# Patient Record
Sex: FEMALE | Race: WHITE | NOT HISPANIC OR LATINO
[De-identification: names, ages, dates, MRNs, and addresses within clinical notes are randomized per-mention and may not be internally consistent; named-entity substitution may affect disease eponyms.]

---

## 2022-04-20 ENCOUNTER — NON-APPOINTMENT (OUTPATIENT)
Age: 62
End: 2022-04-20

## 2022-06-09 ENCOUNTER — NON-APPOINTMENT (OUTPATIENT)
Age: 62
End: 2022-06-09

## 2022-06-09 ENCOUNTER — APPOINTMENT (OUTPATIENT)
Dept: CARDIOLOGY | Facility: CLINIC | Age: 62
End: 2022-06-09
Payer: MEDICAID

## 2022-06-09 VITALS
DIASTOLIC BLOOD PRESSURE: 62 MMHG | HEIGHT: 64 IN | OXYGEN SATURATION: 98 % | HEART RATE: 72 BPM | BODY MASS INDEX: 26.8 KG/M2 | WEIGHT: 157 LBS | RESPIRATION RATE: 16 BRPM | SYSTOLIC BLOOD PRESSURE: 110 MMHG

## 2022-06-09 DIAGNOSIS — G43.909 MIGRAINE, UNSPECIFIED, NOT INTRACTABLE, W/OUT STATUS MIGRAINOSUS: ICD-10-CM

## 2022-06-09 DIAGNOSIS — M81.0 AGE-RELATED OSTEOPOROSIS W/OUT CURRENT PATHOLOGICAL FRACTURE: ICD-10-CM

## 2022-06-09 DIAGNOSIS — J45.909 UNSPECIFIED ASTHMA, UNCOMPLICATED: ICD-10-CM

## 2022-06-09 PROCEDURE — 99214 OFFICE O/P EST MOD 30 MIN: CPT

## 2022-06-09 PROCEDURE — 93000 ELECTROCARDIOGRAM COMPLETE: CPT

## 2022-06-09 RX ORDER — ASPIRIN 81 MG/1
81 TABLET, COATED ORAL
Refills: 0 | Status: ACTIVE | COMMUNITY
Start: 2022-06-09

## 2022-06-09 RX ORDER — ATORVASTATIN CALCIUM 80 MG/1
80 TABLET, FILM COATED ORAL DAILY
Refills: 0 | Status: ACTIVE | COMMUNITY

## 2022-06-09 NOTE — ASSESSMENT
[FreeTextEntry1] : EKG: Sinus rhythm with no significant ST or T wave changes.\par \par 61-year-old female with a past medical history of migraines who recently presented to the hospital with aphasia and right-sided weakness and was found to have a TIA.  The possibility that this was actually complex migraine is also possible but ultimately TIA appears to be most likely.  Work-up in the hospital was unremarkable including an echocardiogram which showed a normal EF and no significant valve disease.  There was no evidence of stroke on testing or any significant carotid disease.  Blood pressure appears to be well controlled.  She was started on aspirin and Plavix along with high-dose statin.  Plavix was recommended for 3 months by neurology.  She asked if she needs to remain on high-dose statin and I will continue that for now but may consider lowering in the future.  She is having some leg cramps but these seem tolerable for now.

## 2022-06-09 NOTE — HISTORY OF PRESENT ILLNESS
[FreeTextEntry1] : Patient presents back to the office today after recently being seen in the hospital where she presented with a TIA.  The symptoms at that time were of some right-sided weakness as well as issues with aphasia.  Symptoms resolved in the hospital.  Imaging showed no evidence of stroke.  She was discharged home, and since being home has noted some occasional issues with aphasia but otherwise feels well.  She says there is sometimes a "disconnect between my brain in my mouth" and also she sometimes has issues with transposing numbers.  She has been having some leg cramps recently and was told that this could be secondary to her Lipitor.  It was very bad for about a week but has since been improving.  She has been monitoring her blood pressure at home on a weekly basis and says it has been "good" but did not bring any numbers with her today.  Patient denies chest pain, shortness of breath, palpitations, orthopnea, presyncope, syncope.

## 2022-06-09 NOTE — DISCUSSION/SUMMARY
[FreeTextEntry1] : 1.  Plan DAVID to complete the work-up for her TIA.\par 2.  Continue Plavix until July 20 and then she can discontinue.  Continue aspirin indefinitely.\par 3.  Continue atorvastatin at 80 mg daily for now.  May consider lowering the dose in the future especially if she has more leg cramps.\par 4.  No additional cardiac medications at this time.\par 5.  Monitor BP at home, keep a log and bring to f/u.\par 6.  Follow-up with neurology.\par 7.  She will have blood work done and get me a copy.\par 8.  Patient is encouraged to exercise at least 30 minutes a day everyday of the week.\par 9.  Follow up here in three months.

## 2022-07-22 RX ORDER — CHLORHEXIDINE GLUCONATE 213 G/1000ML
1 SOLUTION TOPICAL ONCE
Refills: 0 | Status: DISCONTINUED | OUTPATIENT
Start: 2022-07-25 | End: 2022-08-08

## 2022-07-22 NOTE — H&P PST ADULT - HISTORY OF PRESENT ILLNESS
Narrative:     61 yo female with PMHX of Asthma, Migraines, Osteoporosis, who recently had an admission ([facility]) 6/13/22 for stroke like symptoms. Patient reports recent hospital visit where she presented with TIA. Her symptoms were right-sided weakness associated with aphasia. Her symptoms resolved in the hospital. CTH did not show evidence of stroke. Patient endorses "disconnect between my brain and mouth" since hospital discharge. Work up in the hospital was unremarkable including echocardiogram (6/13/22) which showed normal EF 60-65% and no significant valve disease; mild MR, mild to moderate TR. No evidence of stroke or carotid disease. Since TIA, she was placed on Plavix until July 20th, now discontinued. Patient now presents for transesophageal echocardiogram to evaluate structural heart, r/o cardioembolic source of patient's symptoms.       Symptoms:        Angina (Class):        Ischemic Symptoms:     Heart Failure:        Systolic/Diastolic/Combined:        NYHA Class (within 2 weeks):     Assessment of LVEF (Must be within 6 months):       EF:        Assessed by:        Date:     Prior Cardiac Interventions (LHC, stents, CABG):       PCI's (Date, Stents, Vessels):        CABG (Date, Grafts):     Noninvasive Testing:   Stress Test: Date:        Protocol:        Duration of Exercise:        Symptoms:        EKG Changes:        DTS:        Myocardial Imaging:        Risk Assessment (Low, Medium, High):     Echo (Date, Findings):     Antianginal Therapies:        Beta Blockers:         Calcium Channel Blockers:        Long Acting Nitrates:        Ranexa:     Associated Risk Factors:        Cerebrovascular Disease: N/A       Chronic Lung Disease: N/A       Peripheral Arterial Disease: N/A       Chronic Kidney Disease (if yes, what is GFR): N/A       Uncontrolled Diabetes (if yes, what is HgbA1C or FBS): N/A       Poorly Controlled Hypertension (if yes, what is SBP): N/A       Morbid Obesity (if yes, what is BMI): N/A       History of Recent Ventricular Arrhythmia: N/A       Inability to Ambulate Safely: N/A       Need for Therapeutic Anticoagulation: N/A       Antiplatelet or Contrast Allergy: N/A

## 2022-07-22 NOTE — H&P PST ADULT - ASSESSMENT
63 yo female with PMHX of Asthma, Migraines, Osteoporosis, who recently had an admission ([facility]) 6/13/22 for stroke like symptoms. Patient reports recent hospital visit where she presented with TIA. Her symptoms were right-sided weakness associated with aphasia. Her symptoms resolved in the hospital. CTH did not show evidence of stroke. Patient endorses "disconnect between my brain and mouth" since hospital discharge. Work up in the hospital was unremarkable including echocardiogram (6/13/22) which showed normal EF 60-65% and no significant valve disease; mild MR, mild to moderate TR. No evidence of stroke or carotid disease. Since TIA, she was placed on Plavix until July 20th, now discontinued. Patient now presents for transesophageal echocardiogram to evaluate structural heart, r/o cardioembolic source of patient's symptoms.       -pt has been NPO since MN  -labs, ekg ordered  -consent to be obtained by attending and anesthesia  -DAVID order placed

## 2022-07-22 NOTE — H&P PST ADULT - NSICDXPASTMEDICALHX_GEN_ALL_CORE_FT
PAST MEDICAL HISTORY:  Asthma     Migraines     Osteoporosis     TIA (transient ischemic attack)

## 2022-07-25 ENCOUNTER — OUTPATIENT (OUTPATIENT)
Dept: OUTPATIENT SERVICES | Facility: HOSPITAL | Age: 62
LOS: 1 days | End: 2022-07-25
Payer: MEDICAID

## 2022-07-25 ENCOUNTER — FORM ENCOUNTER (OUTPATIENT)
Age: 62
End: 2022-07-25

## 2022-07-25 ENCOUNTER — TRANSCRIPTION ENCOUNTER (OUTPATIENT)
Age: 62
End: 2022-07-25

## 2022-07-25 VITALS
TEMPERATURE: 98 F | RESPIRATION RATE: 16 BRPM | OXYGEN SATURATION: 100 % | HEART RATE: 73 BPM | DIASTOLIC BLOOD PRESSURE: 92 MMHG | SYSTOLIC BLOOD PRESSURE: 137 MMHG

## 2022-07-25 DIAGNOSIS — G45.9 TRANSIENT CEREBRAL ISCHEMIC ATTACK, UNSPECIFIED: ICD-10-CM

## 2022-07-25 LAB
ANION GAP SERPL CALC-SCNC: 10 MMOL/L — SIGNIFICANT CHANGE UP (ref 5–17)
BASOPHILS # BLD AUTO: 0.03 K/UL — SIGNIFICANT CHANGE UP (ref 0–0.2)
BASOPHILS NFR BLD AUTO: 0.3 % — SIGNIFICANT CHANGE UP (ref 0–2)
BUN SERPL-MCNC: 26.8 MG/DL — HIGH (ref 8–20)
CALCIUM SERPL-MCNC: 9.3 MG/DL — SIGNIFICANT CHANGE UP (ref 8.6–10.2)
CHLORIDE SERPL-SCNC: 105 MMOL/L — SIGNIFICANT CHANGE UP (ref 98–107)
CO2 SERPL-SCNC: 26 MMOL/L — SIGNIFICANT CHANGE UP (ref 22–29)
CREAT SERPL-MCNC: 0.69 MG/DL — SIGNIFICANT CHANGE UP (ref 0.5–1.3)
EGFR: 99 ML/MIN/1.73M2 — SIGNIFICANT CHANGE UP
EOSINOPHIL # BLD AUTO: 0.1 K/UL — SIGNIFICANT CHANGE UP (ref 0–0.5)
EOSINOPHIL NFR BLD AUTO: 1.1 % — SIGNIFICANT CHANGE UP (ref 0–6)
GLUCOSE SERPL-MCNC: 99 MG/DL — SIGNIFICANT CHANGE UP (ref 70–99)
HCT VFR BLD CALC: 39.2 % — SIGNIFICANT CHANGE UP (ref 34.5–45)
HGB BLD-MCNC: 13.3 G/DL — SIGNIFICANT CHANGE UP (ref 11.5–15.5)
IMM GRANULOCYTES NFR BLD AUTO: 0.3 % — SIGNIFICANT CHANGE UP (ref 0–1.5)
LYMPHOCYTES # BLD AUTO: 1.77 K/UL — SIGNIFICANT CHANGE UP (ref 1–3.3)
LYMPHOCYTES # BLD AUTO: 20.1 % — SIGNIFICANT CHANGE UP (ref 13–44)
MCHC RBC-ENTMCNC: 30.2 PG — SIGNIFICANT CHANGE UP (ref 27–34)
MCHC RBC-ENTMCNC: 33.9 GM/DL — SIGNIFICANT CHANGE UP (ref 32–36)
MCV RBC AUTO: 89.1 FL — SIGNIFICANT CHANGE UP (ref 80–100)
MONOCYTES # BLD AUTO: 0.4 K/UL — SIGNIFICANT CHANGE UP (ref 0–0.9)
MONOCYTES NFR BLD AUTO: 4.5 % — SIGNIFICANT CHANGE UP (ref 2–14)
NEUTROPHILS # BLD AUTO: 6.49 K/UL — SIGNIFICANT CHANGE UP (ref 1.8–7.4)
NEUTROPHILS NFR BLD AUTO: 73.7 % — SIGNIFICANT CHANGE UP (ref 43–77)
PLATELET # BLD AUTO: 206 K/UL — SIGNIFICANT CHANGE UP (ref 150–400)
POTASSIUM SERPL-MCNC: 4.6 MMOL/L — SIGNIFICANT CHANGE UP (ref 3.5–5.3)
POTASSIUM SERPL-SCNC: 4.6 MMOL/L — SIGNIFICANT CHANGE UP (ref 3.5–5.3)
RBC # BLD: 4.4 M/UL — SIGNIFICANT CHANGE UP (ref 3.8–5.2)
RBC # FLD: 12.5 % — SIGNIFICANT CHANGE UP (ref 10.3–14.5)
SODIUM SERPL-SCNC: 141 MMOL/L — SIGNIFICANT CHANGE UP (ref 135–145)
WBC # BLD: 8.82 K/UL — SIGNIFICANT CHANGE UP (ref 3.8–10.5)
WBC # FLD AUTO: 8.82 K/UL — SIGNIFICANT CHANGE UP (ref 3.8–10.5)

## 2022-07-25 PROCEDURE — 93320 DOPPLER ECHO COMPLETE: CPT | Mod: 26

## 2022-07-25 PROCEDURE — 93320 DOPPLER ECHO COMPLETE: CPT

## 2022-07-25 PROCEDURE — 93325 DOPPLER ECHO COLOR FLOW MAPG: CPT | Mod: 26

## 2022-07-25 PROCEDURE — 76376 3D RENDER W/INTRP POSTPROCES: CPT | Mod: 26

## 2022-07-25 PROCEDURE — 93010 ELECTROCARDIOGRAM REPORT: CPT

## 2022-07-25 PROCEDURE — 93325 DOPPLER ECHO COLOR FLOW MAPG: CPT

## 2022-07-25 PROCEDURE — 93005 ELECTROCARDIOGRAM TRACING: CPT

## 2022-07-25 PROCEDURE — 36415 COLL VENOUS BLD VENIPUNCTURE: CPT

## 2022-07-25 PROCEDURE — 85025 COMPLETE CBC W/AUTO DIFF WBC: CPT

## 2022-07-25 PROCEDURE — 93312 ECHO TRANSESOPHAGEAL: CPT | Mod: 26

## 2022-07-25 PROCEDURE — 80048 BASIC METABOLIC PNL TOTAL CA: CPT

## 2022-07-25 PROCEDURE — 93312 ECHO TRANSESOPHAGEAL: CPT

## 2022-07-25 RX ORDER — CLOPIDOGREL BISULFATE 75 MG/1
1 TABLET, FILM COATED ORAL
Qty: 0 | Refills: 0 | DISCHARGE

## 2022-07-25 RX ORDER — ASPIRIN/CALCIUM CARB/MAGNESIUM 324 MG
1 TABLET ORAL
Qty: 0 | Refills: 0 | DISCHARGE

## 2022-07-25 RX ORDER — MULTIVIT-MIN/FERROUS GLUCONATE 9 MG/15 ML
1 LIQUID (ML) ORAL
Qty: 0 | Refills: 0 | DISCHARGE

## 2022-07-25 RX ORDER — CHOLECALCIFEROL (VITAMIN D3) 125 MCG
1 CAPSULE ORAL
Qty: 0 | Refills: 0 | DISCHARGE

## 2022-07-25 RX ORDER — ATORVASTATIN CALCIUM 80 MG/1
1 TABLET, FILM COATED ORAL
Qty: 0 | Refills: 0 | DISCHARGE

## 2022-07-25 NOTE — DISCHARGE NOTE NURSING/CASE MANAGEMENT/SOCIAL WORK - NSDCPEFALRISK_GEN_ALL_CORE
For information on Fall & Injury Prevention, visit: [email or web address] OR  [email or web address] OR  https://www.cdc.gov/steadi/patient.html

## 2022-07-25 NOTE — DISCHARGE NOTE PROVIDER - NSDCCPTREATMENT_GEN_ALL_CORE_FT
PRINCIPAL PROCEDURE  Procedure: Transesophageal echocardiogram (DAVID)  Findings and Treatment: Notify your doctor if you develop a fever, cough up blood, have any chest pain, palpitations or difficulty breathing. You may take a throat lozenge if you develop a sore throat or try warm salt water gargle. Resume your diet with soft foods first. Follow up with your cardiologist within 2 weeks for a follow up or sooner with any concerns. Report to the nearest ER with any emergencies.

## 2022-07-25 NOTE — DISCHARGE NOTE PROVIDER - CARE PROVIDER_API CALL
Haider Anna)  Ruba Medley at [address] [address]  Phone: [phone]  Fax: [phone]  Follow Up Time:

## 2022-07-25 NOTE — DISCHARGE NOTE PROVIDER - NSDCMRMEDTOKEN_GEN_ALL_CORE_FT
Aspir 81 oral delayed release tablet: 1 tab(s) orally once a day  Calcium 600+D Plus Minerals oral tablet, chewable: 1 tab(s) orally once a day  Lipitor 80 mg oral tablet: 1 tab(s) orally once a day  Multiple Vitamins oral tablet: 1 tab(s) orally once a day  Vitamin D3 25 mcg (1000 intl units) oral capsule: 1 cap(s) orally once a day

## 2022-07-25 NOTE — DISCHARGE NOTE PROVIDER - NSDCFUSCHEDAPPT_GEN_ALL_CORE_FT
Haider Anna  [facility] Physician [facility]  CARDIOLOGY [address]  Scheduled Appointment: 09/21/2022

## 2022-07-25 NOTE — DISCHARGE NOTE PROVIDER - NSDCACTIVITY_GEN_ALL_CORE
Notify your doctor if you develop a fever, cough up blood, have any chest pain, palpitations or difficulty breathing. You may take a throat lozenge if you develop a sore throat or try warm salt water gargle. Resume your diet with soft foods first. Follow up with your cardiologist within 2 weeks for a follow up or sooner with any concerns. Report to the nearest ER with any emergencies./Showering allowed

## 2022-07-25 NOTE — DISCHARGE NOTE PROVIDER - NSDCPNSUBOBJ_GEN_ALL_CORE
Department of Cardiology                                                                  [facility]/[place] [address] [facility]                                                            Telephone: [phone]. Fax:[phone]                                                                         Post-Procedure Note: DAVID                            PHYSICAL EXAM:    T(C): 36.4 (07-25-22 @ 08:11), Max: 36.4 (07-25-22 @ 08:11)  HR: 73 (07-25-22 @ 08:11) (73 - 73)  BP: 137/92 (07-25-22 @ 08:11) (137/92 - 137/92)  RR: 16 (07-25-22 @ 08:11) (16 - 16)  SpO2: 100% (07-25-22 @ 08:11) (100% - 100%)  Wt(kg): --    I&O's Summary      Daily     Daily     Constitutional: A & O x 3  HEENT:   Normal oral mucosa, PERRL, EOMI	  Cardiovascular: Normal S1 S2, No JVD, No murmurs, No edema  Respiratory: Lungs clear to auscultation	  Gastrointestinal:  Soft, Non-tender, + BS	  Skin: No rashes, No ecchymoses, No cyanosis  Neurologic: Non-focal  Extremities: Normal range of motion, No clubbing, cyanosis or edema  Vascular: Peripheral pulses palpable 2+ bilaterally    TELEMETRY: 	  sinus Kayode    	     ASSESSMENT: post DAVID No PFO, no thrombus mild MR Mild mod TR    -Post DAVID orders  -preliminary verbal report; centricity pending  - dc home

## 2022-07-25 NOTE — DISCHARGE NOTE PROVIDER - NSDCDCMDCOMP_GEN_ALL_CORE
This document is complete and the patient is ready for discharge. Referred To Oculoplastics For Closure Text (Leave Blank If You Do Not Want): After obtaining clear surgical margins the patient was sent to oculoplastics for surgical repair.  The patient understands they will receive post-surgical care and follow-up from the referring physician's office.

## 2022-07-25 NOTE — ASU PATIENT PROFILE, ADULT - FALL HARM RISK - UNIVERSAL INTERVENTIONS
Bed in lowest position, wheels locked, appropriate side rails in place/Call bell, personal items and telephone in reach/Instruct patient to call for assistance before getting out of bed or chair/Non-slip footwear when patient is out of bed/[place] to call system/Physically safe environment - no spills, clutter or unnecessary equipment/Purposeful Proactive Rounding/Room/bathroom lighting operational, light cord in reach

## 2022-07-25 NOTE — DISCHARGE NOTE NURSING/CASE MANAGEMENT/SOCIAL WORK - PATIENT PORTAL LINK FT
You can access the FollowMyHealth Patient Portal offered by [facility] by registering at the following website: [email or web address]. By joining [facility]’s FollowMyHealth portal, you will also be able to view your health information using other applications (apps) compatible with our system.

## 2022-07-25 NOTE — ASU PATIENT PROFILE, ADULT - AS SC BRADEN NUTRITION
[FreeTextEntry1] : I have independently reviewed the following:\par CT Chest on 6/14/22 (4) excellent

## 2022-09-21 ENCOUNTER — NON-APPOINTMENT (OUTPATIENT)
Age: 62
End: 2022-09-21

## 2022-09-21 ENCOUNTER — APPOINTMENT (OUTPATIENT)
Dept: CARDIOLOGY | Facility: CLINIC | Age: 62
End: 2022-09-21

## 2022-09-21 VITALS
BODY MASS INDEX: 28.51 KG/M2 | OXYGEN SATURATION: 99 % | RESPIRATION RATE: 16 BRPM | HEART RATE: 72 BPM | DIASTOLIC BLOOD PRESSURE: 85 MMHG | HEIGHT: 64 IN | WEIGHT: 167 LBS | SYSTOLIC BLOOD PRESSURE: 137 MMHG

## 2022-09-21 VITALS — DIASTOLIC BLOOD PRESSURE: 76 MMHG | SYSTOLIC BLOOD PRESSURE: 124 MMHG

## 2022-09-21 PROBLEM — G43.909 MIGRAINE, UNSPECIFIED, NOT INTRACTABLE, WITHOUT STATUS MIGRAINOSUS: Chronic | Status: ACTIVE | Noted: 2022-07-22

## 2022-09-21 PROBLEM — G45.9 TRANSIENT CEREBRAL ISCHEMIC ATTACK, UNSPECIFIED: Chronic | Status: ACTIVE | Noted: 2022-07-22

## 2022-09-21 PROBLEM — M81.0 AGE-RELATED OSTEOPOROSIS WITHOUT CURRENT PATHOLOGICAL FRACTURE: Chronic | Status: ACTIVE | Noted: 2022-07-22

## 2022-09-21 PROBLEM — J45.909 UNSPECIFIED ASTHMA, UNCOMPLICATED: Chronic | Status: ACTIVE | Noted: 2022-07-22

## 2022-09-21 PROCEDURE — 99213 OFFICE O/P EST LOW 20 MIN: CPT | Mod: 25

## 2022-09-21 PROCEDURE — 93000 ELECTROCARDIOGRAM COMPLETE: CPT

## 2022-09-21 RX ORDER — CLOPIDOGREL 75 MG/1
75 TABLET, FILM COATED ORAL
Refills: 0 | Status: DISCONTINUED | COMMUNITY
End: 2022-09-21

## 2022-09-21 NOTE — ASSESSMENT
[FreeTextEntry1] : DAVID July 26, 2022 demonstrated left ventricle normal size and function with ejection fraction of 60 to 65%.  Right ventricle was normal in function.  Size assessment was difficult because of foreshortened views.  Mild mitral, mild to moderate tricuspid and mild pulmonic regurgitation noted.  Bubble study shows no evidence of shunting.  No evidence of a cardiac source of embolism.\par \par EKG: Sinus rhythm with no significant ST or T wave changes.\par \par 62-year-old female with a past medical history of TIA, migraines who presents today for follow-up.  Patient's DAVID shows no evidence of a cardiac source of embolus.  She has some mild to moderate valve disease but no other significant findings.  Ejection fraction is normal.  Blood pressure appears to be well controlled.  Her leg cramps have improved and for now she will continue on high-dose statin.  She is also on aspirin and has stopped Plavix.  I have encouraged her to continue to be active with her exercises.  Review of blood work shows excellent control of her lipids and no evidence of diabetes.

## 2022-09-21 NOTE — DISCUSSION/SUMMARY
[FreeTextEntry1] : 1.  No additional cardiac testing at this time.\par 2.  Continue current cardiac meds in doses as noted above for TIA.\par 3.  Monitor BP at home, keep a log and bring to f/u.\par 4.  Follow-up with neurology.\par 5.  Patient is encouraged to exercise at least 30 minutes a day everyday of the week.\par 6.  Follow up here in 6 months. [EKG obtained to assist in diagnosis and management of assessed problem(s)] : EKG obtained to assist in diagnosis and management of assessed problem(s)

## 2022-09-21 NOTE — HISTORY OF PRESENT ILLNESS
[FreeTextEntry1] : Patient presents back to the office today noting that she still has some issues where she feels as though her speech is disconnected from her brain at times.  She seems to have some issues mostly with numbers but also if she is pressured in her speech.  Her leg cramps have improved.  She is taking magnesium and has been walking a lot more and feels that this is helping.  She feels very good when she does her exercise and is starting to go to the gym as well.  Blood pressures have been in the 110s to 120s over 70s to 80s at home.  Patient denies chest pain, shortness of breath, palpitations, orthopnea, presyncope, syncope.

## 2022-11-09 NOTE — DISCHARGE NOTE PROVIDER - NSDCCPCAREPLAN_GEN_ALL_CORE_FT
Mera Clemens
PRINCIPAL DISCHARGE DIAGNOSIS  Diagnosis: History of TIAs  Assessment and Plan of Treatment: continue current medications   follow up with Dr Brown.

## 2023-03-07 ENCOUNTER — APPOINTMENT (OUTPATIENT)
Dept: CARDIOLOGY | Facility: CLINIC | Age: 63
End: 2023-03-07
Payer: MEDICAID

## 2023-03-07 ENCOUNTER — NON-APPOINTMENT (OUTPATIENT)
Age: 63
End: 2023-03-07

## 2023-03-07 VITALS
WEIGHT: 163 LBS | DIASTOLIC BLOOD PRESSURE: 80 MMHG | RESPIRATION RATE: 16 BRPM | OXYGEN SATURATION: 97 % | HEIGHT: 64 IN | BODY MASS INDEX: 27.83 KG/M2 | HEART RATE: 74 BPM | SYSTOLIC BLOOD PRESSURE: 120 MMHG

## 2023-03-07 PROCEDURE — 93000 ELECTROCARDIOGRAM COMPLETE: CPT

## 2023-03-07 PROCEDURE — 99213 OFFICE O/P EST LOW 20 MIN: CPT | Mod: 25

## 2023-03-07 NOTE — DISCUSSION/SUMMARY
[FreeTextEntry1] : 1.  No additional cardiac testing at this time.\par 2.  Continue current cardiac meds in doses as noted above for TIA.\par 3.  Monitor BP at home, keep a log and bring to f/u.\par 4.  Follow-up with neurology.\par 5.  Patient is encouraged to exercise at least 30 minutes a day everyday of the week.\par 6.  Patient encouraged to work on diet to lose weight.\par 7.  Follow up here in one year or as needed. [EKG obtained to assist in diagnosis and management of assessed problem(s)] : EKG obtained to assist in diagnosis and management of assessed problem(s)

## 2023-03-07 NOTE — HISTORY OF PRESENT ILLNESS
[FreeTextEntry1] : Patient presents back to the office today feeling very well and offering no complaints.  She has been working on losing weight and is looking to start exercising.  She has been doing some walking and is certainly very active taking care of her grandchildren reports no symptoms or limitations in doing so.  She reports blood pressure at home in the 110s to 120s over 70s to low 80s.  Patient denies chest pain, shortness of breath, palpitations, orthopnea, presyncope, syncope.

## 2023-03-07 NOTE — ASSESSMENT
[FreeTextEntry1] : DAVID July 26, 2022 demonstrated left ventricle normal size and function with ejection fraction of 60 to 65%.  Right ventricle was normal in function.  Size assessment was difficult because of foreshortened views.  Mild mitral, mild to moderate tricuspid and mild pulmonic regurgitation noted.  Bubble study shows no evidence of shunting.  No evidence of a cardiac source of embolism.\par \par EKG: Sinus rhythm with no significant ST or T wave changes.\par \par 62-year-old female with a past medical history of TIA, migraines who presents today for follow-up.  Patient continues to do well from a cardiovascular standpoint.  Blood pressure continues to be very well controlled.  She is tolerating aspirin and high-dose statin without a problem.  She reports recent blood work showed good control of her lipids but I will get a copy to review.  EKG today is unremarkable.  I encouraged to continue efforts with diet and exercise and also to find other ways to help with dealing with her stress although I do not believe any medical therapy is needed at this time.

## 2024-02-08 ENCOUNTER — NON-APPOINTMENT (OUTPATIENT)
Age: 64
End: 2024-02-08

## 2024-02-08 ENCOUNTER — APPOINTMENT (OUTPATIENT)
Dept: CARDIOLOGY | Facility: CLINIC | Age: 64
End: 2024-02-08
Payer: MEDICAID

## 2024-02-08 VITALS
SYSTOLIC BLOOD PRESSURE: 160 MMHG | HEART RATE: 65 BPM | DIASTOLIC BLOOD PRESSURE: 90 MMHG | BODY MASS INDEX: 29.02 KG/M2 | RESPIRATION RATE: 16 BRPM | HEIGHT: 64 IN | WEIGHT: 170 LBS

## 2024-02-08 PROCEDURE — 99214 OFFICE O/P EST MOD 30 MIN: CPT

## 2024-02-08 PROCEDURE — G2211 COMPLEX E/M VISIT ADD ON: CPT | Mod: NC,1L

## 2024-02-08 PROCEDURE — 93000 ELECTROCARDIOGRAM COMPLETE: CPT

## 2024-02-08 RX ORDER — ALPRAZOLAM 2 MG/1
TABLET ORAL
Refills: 0 | Status: ACTIVE | COMMUNITY

## 2024-02-08 NOTE — ASSESSMENT
[FreeTextEntry1] : DAVID July 26, 2022 demonstrated left ventricle normal size and function with ejection fraction of 60 to 65%.  Right ventricle was normal in function.  Size assessment was difficult because of foreshortened views.  Mild mitral, mild to moderate tricuspid and mild pulmonic regurgitation noted.  Bubble study shows no evidence of shunting.  No evidence of a cardiac source of embolism.  EKG: Sinus rhythm with no significant ST or T wave changes.  62-year-old female with a past medical history of TIA, migraines who presents today for follow-up.  Patient continues to do well from a cardiovascular standpoint.  Patient returns today concerned because her blood pressures been more elevated this week.  It is certainly elevated here in the office today which is unusual.  This likely is related to her stress and is only worsened by the fact that it was a little high 1 day and that makes her more stressed.  She has Xanax which she certainly could use but I think she would benefit from being on an SSRI given her ongoing anxiety and stress.  I will prescribe low-dose Lexapro and see if this helps.  She will continue to monitor her blood pressure closely and let me know if it goes higher.  For now would not add anything for hypertension but have her come back in a couple of weeks to see that her blood pressure is adequately controlled.

## 2024-02-08 NOTE — DISCUSSION/SUMMARY
[FreeTextEntry1] : 1.  No additional cardiac testing at this time. 2.  She will start Lexapro 10 mg daily to see if this helps with her anxiety and hopefully her blood pressure.  I will hold off on treatment for hypertension for now. 3.  Continue current cardiac meds in doses as noted above for TIA. 4.  Monitor BP at home, keep a log and bring to f/u. 5.  Follow-up with neurology and rheumatology. 6.  Patient is encouraged to exercise at least 30 minutes a day everyday of the week. 7.  Patient encouraged to work on diet to lose weight. 8.  Follow up here in 2 weeks. [EKG obtained to assist in diagnosis and management of assessed problem(s)] : EKG obtained to assist in diagnosis and management of assessed problem(s)

## 2024-02-08 NOTE — HISTORY OF PRESENT ILLNESS
[FreeTextEntry1] : Patient presents back to the office today concerned because her blood pressure has been elevated over the past week.  He is she says even last week her blood pressures have been running steadily in the 110s to 120s systolic.  On Monday she checked it and it was in the 130s and she thought that was odd.  Yesterday she went to rheumatology and it was in the 140s and she became concerned.  Today she checked it and it was 155 systolic.  She does note that she is under a lot of stress recently and is only getting worse.  She is very stressed with caring for her grandchildren as well as with the pain she is having with her arthritis and the workup for that that is ongoing.  She was given Xanax by her PCP but has not really been taking it very frequently.  She does note that when she takes that she seems to feel better.  She reports no other real physical symptoms at this time not related to her arthritis.  Patient denies chest pain, shortness of breath, palpitations, orthopnea, presyncope, syncope.

## 2024-02-21 ENCOUNTER — APPOINTMENT (OUTPATIENT)
Dept: CARDIOLOGY | Facility: CLINIC | Age: 64
End: 2024-02-21
Payer: MEDICAID

## 2024-02-21 PROCEDURE — 93306 TTE W/DOPPLER COMPLETE: CPT

## 2024-03-01 ENCOUNTER — APPOINTMENT (OUTPATIENT)
Dept: CARDIOLOGY | Facility: CLINIC | Age: 64
End: 2024-03-01
Payer: MEDICAID

## 2024-03-01 VITALS
SYSTOLIC BLOOD PRESSURE: 148 MMHG | BODY MASS INDEX: 28.85 KG/M2 | DIASTOLIC BLOOD PRESSURE: 89 MMHG | HEIGHT: 64 IN | WEIGHT: 169 LBS | HEART RATE: 62 BPM | RESPIRATION RATE: 16 BRPM

## 2024-03-01 DIAGNOSIS — I70.0 ATHEROSCLEROSIS OF AORTA: ICD-10-CM

## 2024-03-01 DIAGNOSIS — R03.0 ELEVATED BLOOD-PRESSURE READING, W/OUT DIAGNOSIS OF HYPERTENSION: ICD-10-CM

## 2024-03-01 PROCEDURE — G2211 COMPLEX E/M VISIT ADD ON: CPT | Mod: NC,1L

## 2024-03-01 PROCEDURE — 99214 OFFICE O/P EST MOD 30 MIN: CPT

## 2024-03-01 PROCEDURE — 93000 ELECTROCARDIOGRAM COMPLETE: CPT

## 2024-03-08 NOTE — ASSESSMENT
[FreeTextEntry1] : DAVID July 26, 2022 demonstrated left ventricle normal size and function with ejection fraction of 60 to 65%.  Right ventricle was normal in function.  Size assessment was difficult because of foreshortened views.  Mild mitral, mild to moderate tricuspid and mild pulmonic regurgitation noted.  Bubble study shows no evidence of shunting.  No evidence of a cardiac source of embolism.  Echocardiogram five 8/21 2024 demonstrated left atrial normal size and function with ejection fraction of 55 to 60%.  Mild mitral and mild to moderate tricuspid regurgitation noted.  63-year-old female with a past medical history of TIA, migraines who presents today for follow-up.  Patient presents back still having significantly elevated blood pressures.  I still believe there is a significant stress component and she has not actually started Lexapro yet but says she will do so.  Despite that given the continued elevated blood pressures I will treat her for hypertension.  I will start with telmisartan 20 mg daily.  When she comes for follow-up if her blood pressures are persistently and more resistantly elevated I will consider a secondary workup.  However for now this may still be more secondary to stress.  She says she will start the Lexapro and will follow-up with her primary.  She also needs to have blood work done and will have that done with her primary as well.

## 2024-03-08 NOTE — DISCUSSION/SUMMARY
[EKG obtained to assist in diagnosis and management of assessed problem(s)] : EKG obtained to assist in diagnosis and management of assessed problem(s) [FreeTextEntry1] : 1.  No additional cardiac testing at this time. 2.  She will start Lexapro 10 mg daily to see if this helps with her anxiety and hopefully her blood pressure.  3.  Start telmisartan 20 mg daily for hypertension.  Blood work with her primary in a couple of weeks. 3.  Continue current cardiac meds in doses as noted above for TIA. 4.  Monitor BP at home, keep a log and bring to f/u. 5.  Follow-up with neurology and rheumatology. 6.  Patient is encouraged to exercise at least 30 minutes a day everyday of the week. 7.  Patient encouraged to work on diet to lose weight. 8.  Follow up here in 2 months.

## 2024-03-08 NOTE — HISTORY OF PRESENT ILLNESS
[FreeTextEntry1] : Patient presents back to the office today having gotten the Lexapro but not having yet started it.  She is still under a lot of stress but is hoping this will get better in the next week as her responsibilities for caring for her grandchildren will lessen to some degree.  Blood pressures have continued to be in the 130s to 150s over 80s to 90s.  She has been found to have significant osteoporosis and is considering a new medication.  As part of that workup she had an x-ray of her lumbar spine which showed atherosclerosis in her aorta.  She is concerned about this as well.  Patient denies chest pain, shortness of breath, palpitations, orthopnea, presyncope, syncope.

## 2024-03-08 NOTE — ADDENDUM
[FreeTextEntry1] : Given patient's otherwise stable cardiac pattern as noted above she is without cardiac contraindication to LEEP procedure at this time. Aspirin may be held for up to 1 week prior to procedure and restarted at the discretion of her surgeon.  Monitor through the procedure until stable post procedurally.  Continue other current cardiac meds in doses as noted above.

## 2024-03-28 ENCOUNTER — APPOINTMENT (OUTPATIENT)
Dept: CARDIOLOGY | Facility: CLINIC | Age: 64
End: 2024-03-28
Payer: MEDICAID

## 2024-03-28 PROCEDURE — 93978 VASCULAR STUDY: CPT

## 2024-04-09 ENCOUNTER — NON-APPOINTMENT (OUTPATIENT)
Age: 64
End: 2024-04-09

## 2024-05-13 ENCOUNTER — APPOINTMENT (OUTPATIENT)
Dept: CARDIOLOGY | Facility: CLINIC | Age: 64
End: 2024-05-13
Payer: MEDICAID

## 2024-05-13 ENCOUNTER — NON-APPOINTMENT (OUTPATIENT)
Age: 64
End: 2024-05-13

## 2024-05-13 VITALS
SYSTOLIC BLOOD PRESSURE: 128 MMHG | DIASTOLIC BLOOD PRESSURE: 80 MMHG | RESPIRATION RATE: 16 BRPM | HEART RATE: 61 BPM | WEIGHT: 169 LBS | BODY MASS INDEX: 28.85 KG/M2 | HEIGHT: 64 IN

## 2024-05-13 DIAGNOSIS — G45.9 TRANSIENT CEREBRAL ISCHEMIC ATTACK, UNSPECIFIED: ICD-10-CM

## 2024-05-13 DIAGNOSIS — I10 ESSENTIAL (PRIMARY) HYPERTENSION: ICD-10-CM

## 2024-05-13 PROCEDURE — 99214 OFFICE O/P EST MOD 30 MIN: CPT

## 2024-05-13 PROCEDURE — G2211 COMPLEX E/M VISIT ADD ON: CPT | Mod: NC,1L

## 2024-05-13 PROCEDURE — 93000 ELECTROCARDIOGRAM COMPLETE: CPT

## 2024-05-13 RX ORDER — ESCITALOPRAM OXALATE 10 MG/1
10 TABLET ORAL
Qty: 90 | Refills: 0 | Status: ACTIVE | COMMUNITY
Start: 2024-02-08 | End: 1900-01-01

## 2024-05-13 RX ORDER — TELMISARTAN 20 MG/1
20 TABLET ORAL
Qty: 90 | Refills: 1 | Status: ACTIVE | COMMUNITY
Start: 2024-03-01 | End: 1900-01-01

## 2024-05-13 NOTE — ASSESSMENT
[FreeTextEntry1] : DAVID July 26, 2022 demonstrated left ventricle normal size and function with ejection fraction of 60 to 65%.  Right ventricle was normal in function.  Size assessment was difficult because of foreshortened views.  Mild mitral, mild to moderate tricuspid and mild pulmonic regurgitation noted.  Bubble study shows no evidence of shunting.  No evidence of a cardiac source of embolism.  Echocardiogram five 8/21 2024 demonstrated left atrial normal size and function with ejection fraction of 55 to 60%.  Mild mitral and mild to moderate tricuspid regurgitation noted.  EKG: Sinus rhythm with no significant ST or T wave changes.  63-year-old female with a past medical history of HTN, TIA, migraines, anxiety who presents today for follow-up.  Patient presents back with much better blood pressures now on telmisartan and also treatment for anxiety.  With the Lexapro her anxiety has dramatically improved and she is not taking Xanax.  I would not make any changes to medication for today.  EKG today is unremarkable.  Recent blood work shows good control of her lipids and no evidence of diabetes and is otherwise unremarkable.

## 2024-05-13 NOTE — HISTORY OF PRESENT ILLNESS
[FreeTextEntry1] : Patient presents back to the office today feeling much better than last I saw her.  Since being on the Lexapro she feels much better.  She has not been so anxious at all and has not really needed Xanax at all.  She had her LEEP procedure done and it was negative and ultimately everything was good.  She did have some bleeding afterwards but this has improved.  She also tolerated the addition of telmisartan and recently blood pressures have been in the 120s to 130s systolic.  Patient denies chest pain, shortness of breath, palpitations, orthopnea, presyncope, syncope.

## 2024-05-13 NOTE — DISCUSSION/SUMMARY
[FreeTextEntry1] : 1.  No additional cardiac testing at this time. 2.  Continue Lexapro 10 mg daily for her anxiety.  She will get future prescriptions from her primary but I have given her 1 today. 3.  Continue telmisartan 20 mg daily for hypertension.   4.  Continue other current cardiac meds in doses as noted above for TIA. 5.  Monitor BP at home, keep a log and bring to f/u. 6.  Follow-up with neurology and rheumatology. 7.  Patient is encouraged to exercise at least 30 minutes a day everyday of the week. 8.  Patient encouraged to work on diet to lose weight. 9.  Follow up here in 6 months. [EKG obtained to assist in diagnosis and management of assessed problem(s)] : EKG obtained to assist in diagnosis and management of assessed problem(s)

## 2024-10-09 ENCOUNTER — RX RENEWAL (OUTPATIENT)
Age: 64
End: 2024-10-09

## 2024-11-14 ENCOUNTER — APPOINTMENT (OUTPATIENT)
Dept: CARDIOLOGY | Facility: CLINIC | Age: 64
End: 2024-11-14
Payer: MEDICAID

## 2024-11-14 VITALS
HEART RATE: 67 BPM | SYSTOLIC BLOOD PRESSURE: 122 MMHG | HEIGHT: 64 IN | DIASTOLIC BLOOD PRESSURE: 82 MMHG | BODY MASS INDEX: 29.02 KG/M2 | WEIGHT: 170 LBS

## 2024-11-14 DIAGNOSIS — G45.9 TRANSIENT CEREBRAL ISCHEMIC ATTACK, UNSPECIFIED: ICD-10-CM

## 2024-11-14 DIAGNOSIS — I10 ESSENTIAL (PRIMARY) HYPERTENSION: ICD-10-CM

## 2024-11-14 PROCEDURE — 99214 OFFICE O/P EST MOD 30 MIN: CPT

## 2024-11-14 PROCEDURE — G2211 COMPLEX E/M VISIT ADD ON: CPT | Mod: NC

## 2024-11-14 PROCEDURE — 93000 ELECTROCARDIOGRAM COMPLETE: CPT

## 2025-02-21 LAB — A1CG - A1C WITH ESTIMATED AVERAGE GLUCOSE: 5.5

## 2025-05-06 ENCOUNTER — NON-APPOINTMENT (OUTPATIENT)
Age: 65
End: 2025-05-06

## 2025-05-07 ENCOUNTER — NON-APPOINTMENT (OUTPATIENT)
Age: 65
End: 2025-05-07

## 2025-05-07 ENCOUNTER — APPOINTMENT (OUTPATIENT)
Dept: CARDIOLOGY | Facility: CLINIC | Age: 65
End: 2025-05-07
Payer: MEDICAID

## 2025-05-07 VITALS
BODY MASS INDEX: 28 KG/M2 | DIASTOLIC BLOOD PRESSURE: 82 MMHG | HEART RATE: 64 BPM | WEIGHT: 164 LBS | HEIGHT: 64 IN | RESPIRATION RATE: 16 BRPM | SYSTOLIC BLOOD PRESSURE: 120 MMHG

## 2025-05-07 DIAGNOSIS — I10 ESSENTIAL (PRIMARY) HYPERTENSION: ICD-10-CM

## 2025-05-07 DIAGNOSIS — G45.9 TRANSIENT CEREBRAL ISCHEMIC ATTACK, UNSPECIFIED: ICD-10-CM

## 2025-05-07 PROCEDURE — 93000 ELECTROCARDIOGRAM COMPLETE: CPT

## 2025-05-07 PROCEDURE — 99214 OFFICE O/P EST MOD 30 MIN: CPT

## 2025-05-07 RX ORDER — ALENDRONATE SODIUM 70 MG/1
70 TABLET ORAL
Refills: 0 | Status: ACTIVE | COMMUNITY